# Patient Record
Sex: FEMALE | HISPANIC OR LATINO
[De-identification: names, ages, dates, MRNs, and addresses within clinical notes are randomized per-mention and may not be internally consistent; named-entity substitution may affect disease eponyms.]

---

## 2024-05-13 ENCOUNTER — DASHBOARD ENCOUNTERS (OUTPATIENT)
Age: 69
End: 2024-05-13

## 2024-05-13 ENCOUNTER — OFFICE VISIT (OUTPATIENT)
Dept: URBAN - METROPOLITAN AREA CLINIC 60 | Facility: CLINIC | Age: 69
End: 2024-05-13
Payer: MEDICARE

## 2024-05-13 VITALS
WEIGHT: 130 LBS | TEMPERATURE: 98.3 F | BODY MASS INDEX: 26.21 KG/M2 | HEART RATE: 77 BPM | OXYGEN SATURATION: 99 % | SYSTOLIC BLOOD PRESSURE: 132 MMHG | DIASTOLIC BLOOD PRESSURE: 86 MMHG | HEIGHT: 59 IN

## 2024-05-13 DIAGNOSIS — Z12.11 COLON CANCER SCREENING: ICD-10-CM

## 2024-05-13 DIAGNOSIS — R19.7 ACUTE DIARRHEA: ICD-10-CM

## 2024-05-13 PROCEDURE — 99203 OFFICE O/P NEW LOW 30 MIN: CPT

## 2024-05-13 RX ORDER — PIOGLITAZONE 45 MG/1
TABLET ORAL
Qty: 100 TABLET | Status: ACTIVE | COMMUNITY

## 2024-05-13 RX ORDER — METFORMIN HYDROCHLORIDE 1000 MG/1
TOME 1 TABLETA POR VIA ORAL 2 VECES AL DIA CON COMIDA TABLET ORAL
Qty: 200 EACH | Refills: 0 | Status: ACTIVE | COMMUNITY

## 2024-05-13 RX ORDER — ONDANSETRON HYDROCHLORIDE 4 MG/1
1 TABLET TABLET, FILM COATED ORAL
Qty: 2 | Refills: 0 | OUTPATIENT
Start: 2024-05-13

## 2024-05-13 RX ORDER — RISEDRONATE SODIUM 150 MG/1
1 TABLET AT LEAST 30 MINUTES BEFORE THE FIRST FOOD OR DRINK, OTHER THAN WATER, OF THE DAY TABLET, FILM COATED ORAL
Status: ACTIVE | COMMUNITY

## 2024-05-13 RX ORDER — SIMVASTATIN 20 MG/1
TABLET, FILM COATED ORAL
Qty: 100 TABLET | Status: ACTIVE | COMMUNITY

## 2024-05-13 RX ORDER — ENALAPRIL MALEATE 10 MG/1
TOME 1 TABLETA POR VIA ORAL CADA DIA TABLET ORAL
Qty: 100 EACH | Refills: 0 | Status: ACTIVE | COMMUNITY

## 2024-05-13 RX ORDER — FERROUS SULFATE TAB 325 MG (65 MG ELEMENTAL FE) 325 (65 FE) MG
TOME 1 TABLETA POR VIA ORAL CADA DIA TAB ORAL
Qty: 100 EACH | Refills: 0 | Status: ACTIVE | COMMUNITY

## 2024-05-20 ENCOUNTER — LAB OUTSIDE AN ENCOUNTER (OUTPATIENT)
Dept: URBAN - METROPOLITAN AREA CLINIC 60 | Facility: CLINIC | Age: 69
End: 2024-05-20

## 2024-05-29 LAB
CAMPYLOBACTER SPP. AG,EIA: (no result)
CLOSTRIDIUM DIFFICILE: (no result)
OVA AND PARASITES, CONC AND PERM SMEAR: (no result)
SALMONELLA AND SHIGELLA, CULTURE: (no result)
SHIGA TOXINS, EIA W/RFL TO E.COLI O157 CULTURE: (no result)

## 2024-07-15 ENCOUNTER — TELEPHONE ENCOUNTER (OUTPATIENT)
Dept: URBAN - METROPOLITAN AREA CLINIC 60 | Facility: CLINIC | Age: 69
End: 2024-07-15

## 2024-07-31 ENCOUNTER — CLAIMS CREATED FROM THE CLAIM WINDOW (OUTPATIENT)
Dept: URBAN - METROPOLITAN AREA SURGERY CENTER 4 | Facility: SURGERY CENTER | Age: 69
End: 2024-07-31
Payer: MEDICARE

## 2024-07-31 DIAGNOSIS — K64.1 SECOND DEGREE HEMORRHOIDS: ICD-10-CM

## 2024-07-31 DIAGNOSIS — Z12.11 ENCOUNTER FOR SCREENING FOR MALIGNANT NEOPLASM OF COLON: ICD-10-CM

## 2024-07-31 DIAGNOSIS — K63.89 OTHER SPECIFIED DISEASES OF INTESTINE: ICD-10-CM

## 2024-07-31 PROCEDURE — 45380 COLONOSCOPY AND BIOPSY: CPT | Performed by: INTERNAL MEDICINE

## 2024-07-31 RX ORDER — METFORMIN HYDROCHLORIDE 1000 MG/1
TOME 1 TABLETA POR VIA ORAL 2 VECES AL DIA CON COMIDA TABLET ORAL
Qty: 200 EACH | Refills: 0 | Status: ACTIVE | COMMUNITY

## 2024-07-31 RX ORDER — SIMVASTATIN 20 MG/1
TABLET, FILM COATED ORAL
Qty: 100 TABLET | Status: ACTIVE | COMMUNITY

## 2024-07-31 RX ORDER — ENALAPRIL MALEATE 10 MG/1
TOME 1 TABLETA POR VIA ORAL CADA DIA TABLET ORAL
Qty: 100 EACH | Refills: 0 | Status: ACTIVE | COMMUNITY

## 2024-07-31 RX ORDER — RISEDRONATE SODIUM 150 MG/1
1 TABLET AT LEAST 30 MINUTES BEFORE THE FIRST FOOD OR DRINK, OTHER THAN WATER, OF THE DAY TABLET, FILM COATED ORAL
Status: ACTIVE | COMMUNITY

## 2024-07-31 RX ORDER — PIOGLITAZONE 45 MG/1
TABLET ORAL
Qty: 100 TABLET | Status: ACTIVE | COMMUNITY

## 2024-07-31 RX ORDER — ONDANSETRON HYDROCHLORIDE 4 MG/1
1 TABLET TABLET, FILM COATED ORAL
Qty: 2 | Refills: 0 | Status: ACTIVE | COMMUNITY
Start: 2024-05-13

## 2024-07-31 RX ORDER — FERROUS SULFATE TAB 325 MG (65 MG ELEMENTAL FE) 325 (65 FE) MG
TOME 1 TABLETA POR VIA ORAL CADA DIA TAB ORAL
Qty: 100 EACH | Refills: 0 | Status: ACTIVE | COMMUNITY

## 2024-08-12 ENCOUNTER — OFFICE VISIT (OUTPATIENT)
Dept: URBAN - METROPOLITAN AREA CLINIC 60 | Facility: CLINIC | Age: 69
End: 2024-08-12

## 2024-08-12 RX ORDER — RISEDRONATE SODIUM 150 MG/1
1 TABLET AT LEAST 30 MINUTES BEFORE THE FIRST FOOD OR DRINK, OTHER THAN WATER, OF THE DAY TABLET, FILM COATED ORAL
COMMUNITY

## 2024-08-12 RX ORDER — FERROUS SULFATE TAB 325 MG (65 MG ELEMENTAL FE) 325 (65 FE) MG
TOME 1 TABLETA POR VIA ORAL CADA DIA TAB ORAL
Qty: 100 EACH | Refills: 0 | COMMUNITY

## 2024-08-12 RX ORDER — SIMVASTATIN 20 MG/1
TABLET, FILM COATED ORAL
Qty: 100 TABLET | COMMUNITY

## 2024-08-12 RX ORDER — ONDANSETRON HYDROCHLORIDE 4 MG/1
1 TABLET TABLET, FILM COATED ORAL
Qty: 2 | Refills: 0 | COMMUNITY
Start: 2024-05-13

## 2024-08-12 RX ORDER — PIOGLITAZONE 45 MG/1
TABLET ORAL
Qty: 100 TABLET | COMMUNITY

## 2024-08-12 RX ORDER — METFORMIN HYDROCHLORIDE 1000 MG/1
TOME 1 TABLETA POR VIA ORAL 2 VECES AL DIA CON COMIDA TABLET ORAL
Qty: 200 EACH | Refills: 0 | COMMUNITY

## 2024-08-12 RX ORDER — ENALAPRIL MALEATE 10 MG/1
TOME 1 TABLETA POR VIA ORAL CADA DIA TABLET ORAL
Qty: 100 EACH | Refills: 0 | COMMUNITY

## 2024-08-12 NOTE — HPI-ZZZTODAY'S VISIT
Patient is a very pleasant 68-year-old female who presents for follow up colonoscopy. Past medical history significant for hypertension, diabetes, ADRIANA. Past surgical history reviewed. Denies previous colonoscopy or EGD. Denies a family history of colon polyps or GI malignancy.  Colonoscopy 7/31/2024 with nonthrombosed external hemorrhoids and grade 2 internal hemorrhoids, erythematous and eroded mucosa in the sigmoid, descending and ascending colon.  Random colon biopsy with no significant abnormality.  Tash presents for colonoscopy screening and diarrhea. She is also complaining of loose stools for one month between 1-4 times a day. She She denies dysphagia, dyspepsia, pyrosis, abdominal pain, unintentional weight loss, melena, or hematochezia.

## 2024-08-12 NOTE — HPI-PREVIOUS LABS
1/4/2024 CBC within normal limits, CMP significant for glucose 126, lipid panel within normal limits, A1c 7.4